# Patient Record
Sex: FEMALE | Race: WHITE | NOT HISPANIC OR LATINO | Employment: UNEMPLOYED | URBAN - METROPOLITAN AREA
[De-identification: names, ages, dates, MRNs, and addresses within clinical notes are randomized per-mention and may not be internally consistent; named-entity substitution may affect disease eponyms.]

---

## 2017-10-05 ENCOUNTER — OFFICE VISIT (OUTPATIENT)
Dept: RETAIL CLINIC | Facility: CLINIC | Age: 32
End: 2017-10-05

## 2017-10-05 DIAGNOSIS — Z02.83 ENCOUNTER FOR DRUG SCREENING: Primary | ICD-10-CM

## 2017-10-05 NOTE — PROGRESS NOTES
Taylor GREENBERG is a 31 y.o. female.     Patient presents to clinic for an E-Screen. See scanned documents.     Diagnosis for this visit:  Encounter for drug screening [Z02.83]    Notes: See scanned [facility].    Follow-up with employer results.

## 2021-05-28 ENCOUNTER — PRIOR AUTHORIZATION (OUTPATIENT)
Dept: GASTROENTEROLOGY | Facility: CLINIC | Age: 36
End: 2021-05-28

## 2021-05-30 ENCOUNTER — APPOINTMENT (OUTPATIENT)
Dept: PREADMISSION TESTING | Facility: HOSPITAL | Age: 36
End: 2021-05-30

## 2021-05-30 LAB — SARS-COV-2 RNA NOSE QL NAA+PROBE: NOT DETECTED

## 2021-05-30 PROCEDURE — U0004 COV-19 TEST NON-CDC HGH THRU: HCPCS

## 2021-05-30 PROCEDURE — C9803 HOPD COVID-19 SPEC COLLECT: HCPCS

## 2021-06-02 ENCOUNTER — OUTSIDE FACILITY SERVICE (OUTPATIENT)
Dept: GASTROENTEROLOGY | Facility: CLINIC | Age: 36
End: 2021-06-02

## 2021-06-02 PROCEDURE — 88305 TISSUE EXAM BY PATHOLOGIST: CPT | Performed by: INTERNAL MEDICINE

## 2021-06-02 PROCEDURE — 43239 EGD BIOPSY SINGLE/MULTIPLE: CPT | Performed by: INTERNAL MEDICINE

## 2021-06-03 ENCOUNTER — LAB REQUISITION (OUTPATIENT)
Dept: LAB | Facility: HOSPITAL | Age: 36
End: 2021-06-03

## 2021-06-03 DIAGNOSIS — K21.9 GASTRO-ESOPHAGEAL REFLUX DISEASE WITHOUT ESOPHAGITIS: ICD-10-CM

## 2021-06-03 DIAGNOSIS — R11.0 NAUSEA: ICD-10-CM

## 2021-06-04 LAB
CYTO UR: NORMAL
LAB AP CASE REPORT: NORMAL
LAB AP CLINICAL INFORMATION: NORMAL
PATH REPORT.FINAL DX SPEC: NORMAL
PATH REPORT.GROSS SPEC: NORMAL

## 2022-07-26 ENCOUNTER — OFFICE VISIT (OUTPATIENT)
Dept: FAMILY MEDICINE CLINIC | Facility: CLINIC | Age: 37
End: 2022-07-26

## 2022-07-26 VITALS
SYSTOLIC BLOOD PRESSURE: 108 MMHG | DIASTOLIC BLOOD PRESSURE: 64 MMHG | BODY MASS INDEX: 32.07 KG/M2 | TEMPERATURE: 98.1 F | HEART RATE: 84 BPM | OXYGEN SATURATION: 97 % | WEIGHT: 224 LBS | HEIGHT: 70 IN

## 2022-07-26 DIAGNOSIS — Z13.220 LIPID SCREENING: ICD-10-CM

## 2022-07-26 DIAGNOSIS — R53.83 FATIGUE, UNSPECIFIED TYPE: ICD-10-CM

## 2022-07-26 DIAGNOSIS — E11.65 TYPE 2 DIABETES MELLITUS WITH HYPERGLYCEMIA, WITHOUT LONG-TERM CURRENT USE OF INSULIN: Primary | ICD-10-CM

## 2022-07-26 PROCEDURE — 99204 OFFICE O/P NEW MOD 45 MIN: CPT | Performed by: PHYSICIAN ASSISTANT

## 2022-07-26 PROCEDURE — 36415 COLL VENOUS BLD VENIPUNCTURE: CPT | Performed by: PHYSICIAN ASSISTANT

## 2022-07-26 RX ORDER — METFORMIN HYDROCHLORIDE 500 MG/1
500 TABLET, EXTENDED RELEASE ORAL
COMMUNITY
Start: 2022-07-15 | End: 2022-07-27 | Stop reason: SDUPTHER

## 2022-07-26 NOTE — PROGRESS NOTES
"Chief Complaint  Diabetes    Subjective          Dottie GERENBERG presents to [facility]  Patient in today to establish care and to get labs drawn.  She is a type 2  diabetic and has been off her medication for past 3 months up until the last 2 weeks, when she was started back on metformin 500 once a day through the ER.  She had the ER visit for episode of dehydration and near syncopal episode.  She has been seeing hematology for persistent leukocytosis and is still in further evaluation with them . States her sugars had been running in the high 200s without medication.  Denies any chest pain or shortness of breath.    Diabetes  She presents for her follow-up diabetic visit. She has type 2 diabetes mellitus. Pertinent negatives for hypoglycemia include no dizziness. Pertinent negatives for diabetes include no blurred vision, no chest pain and no fatigue.       Objective   Vital Signs:   /64 (BP Location: Left arm, Patient Position: Sitting, Cuff Size: Large Adult)   Pulse 84   Temp 98.1 °F (36.7 °C)   Ht 176.5 cm (69.5\")   Wt 102 kg (224 lb)   SpO2 97%   BMI 32.60 kg/m²     Body mass index is 32.6 kg/m².    Review of Systems   Constitutional: Negative for fatigue.   HENT: Negative for congestion and sore throat.    Eyes: Negative for blurred vision.   Respiratory: Negative for cough and shortness of breath.    Cardiovascular: Negative for chest pain.   Gastrointestinal: Negative for abdominal pain, diarrhea, nausea and vomiting.   Genitourinary: Negative for dysuria and frequency.   Neurological: Negative for dizziness and headache.       Past History:  Medical History: has a past medical history of Anorexia, Anxiety, Bipolar disorder (HCC), Bulimia, Chronic insomnia, Depression, Gestational diabetes, Panic disorder, and Pregnancy.   Surgical History: has a past surgical history that includes Hand surgery (Right, 2000); Nose surgery (1994); Tonsillectomy; Cholecystectomy; and " Other surgical history.   Family History: family history includes Breast cancer (age of onset: 50) in her maternal aunt; Depression in her mother; Diabetes in her maternal grandmother and mother; Hypertension in her maternal grandfather and mother; Hypothyroidism in her mother; Kidney failure in her mother; Ovarian cancer in her maternal aunt.   Social History: reports that she quit smoking about 7 weeks ago. Her smoking use included cigarettes. She started smoking about 19 years ago. She smoked 0.50 packs per day. She has never used smokeless tobacco. She reports that she does not drink alcohol and does not use drugs.      Current Outpatient Medications:   •  metFORMIN ER (GLUCOPHAGE-XR) 500 MG 24 hr tablet, Take 500 mg by mouth Daily With Breakfast., Disp: , Rfl:   Allergies: Aspirin    Physical Exam  Constitutional:       Appearance: Normal appearance.   HENT:      Right Ear: Tympanic membrane normal.      Left Ear: Tympanic membrane normal.      Mouth/Throat:      Pharynx: Oropharynx is clear.   Eyes:      Conjunctiva/sclera: Conjunctivae normal.      Pupils: Pupils are equal, round, and reactive to light.   Cardiovascular:      Rate and Rhythm: Normal rate and regular rhythm.      Heart sounds: Normal heart sounds.   Pulmonary:      Effort: Pulmonary effort is normal.      Breath sounds: Normal breath sounds.   Abdominal:      Palpations: Abdomen is soft.      Tenderness: There is no abdominal tenderness.   Neurological:      Mental Status: She is oriented to person, place, and time.   Psychiatric:         Mood and Affect: Mood normal.         Behavior: Behavior normal.             Assessment and Plan   Diagnoses and all orders for this visit:    1. Type 2 diabetes mellitus with hyperglycemia, without long-term current use of insulin (HCC) (Primary)  -     Comprehensive Metabolic Panel; Future  -     Hemoglobin A1c; Future  We will check hemoglobin A1c today.  Encouraged healthy diet and exercise.  Discussed  suspect we will be increasing dosage of her metformin and then recheck in 1 month with sugar readings.  Return to clinic prior as needed.  2. Fatigue, unspecified type  -     TSH; Future  Check TSH with labs.  3. Lipid screening  -     Lipid Panel; Future  We will check fasting labs today.          Follow Up   No follow-ups on file.  Patient was given instructions and counseling regarding her condition or for health maintenance advice. Please see specific information pulled into the AVS if appropriate.     Carie Headley PA-C

## 2022-07-27 LAB
ALBUMIN SERPL-MCNC: 4.4 G/DL (ref 3.8–4.8)
ALBUMIN/GLOB SERPL: 1.5 {RATIO} (ref 1.2–2.2)
ALP SERPL-CCNC: 146 IU/L (ref 44–121)
ALT SERPL-CCNC: 12 IU/L (ref 0–32)
AST SERPL-CCNC: 12 IU/L (ref 0–40)
BILIRUB SERPL-MCNC: 0.3 MG/DL (ref 0–1.2)
BUN SERPL-MCNC: 12 MG/DL (ref 6–20)
BUN/CREAT SERPL: 19 (ref 9–23)
CALCIUM SERPL-MCNC: 9.5 MG/DL (ref 8.7–10.2)
CHLORIDE SERPL-SCNC: 95 MMOL/L (ref 96–106)
CHOLEST SERPL-MCNC: 203 MG/DL (ref 100–199)
CO2 SERPL-SCNC: 20 MMOL/L (ref 20–29)
CREAT SERPL-MCNC: 0.63 MG/DL (ref 0.57–1)
EGFRCR SERPLBLD CKD-EPI 2021: 117 ML/MIN/1.73
GLOBULIN SER CALC-MCNC: 2.9 G/DL (ref 1.5–4.5)
GLUCOSE SERPL-MCNC: 395 MG/DL (ref 65–99)
HBA1C MFR BLD: 10.8 % (ref 4.8–5.6)
HDLC SERPL-MCNC: 30 MG/DL
LDLC SERPL CALC-MCNC: 109 MG/DL (ref 0–99)
POTASSIUM SERPL-SCNC: 4.3 MMOL/L (ref 3.5–5.2)
PROT SERPL-MCNC: 7.3 G/DL (ref 6–8.5)
SODIUM SERPL-SCNC: 132 MMOL/L (ref 134–144)
TRIGL SERPL-MCNC: 372 MG/DL (ref 0–149)
TSH SERPL DL<=0.005 MIU/L-ACNC: 1.77 UIU/ML (ref 0.45–4.5)
VLDLC SERPL CALC-MCNC: 64 MG/DL (ref 5–40)

## 2022-07-27 RX ORDER — METFORMIN HYDROCHLORIDE 500 MG/1
TABLET, EXTENDED RELEASE ORAL
Qty: 60 TABLET | Refills: 0 | Status: SHIPPED | OUTPATIENT
Start: 2022-07-27 | End: 2022-08-26

## 2022-07-28 ENCOUNTER — TELEPHONE (OUTPATIENT)
Dept: FAMILY MEDICINE CLINIC | Facility: CLINIC | Age: 37
End: 2022-07-28

## 2022-07-29 NOTE — TELEPHONE ENCOUNTER
Caller: Dottie GREENBERG    Relationship: Self    Best call back number: [phone]    Requested Prescriptions:   Requested Prescriptions      No prescriptions requested or ordered in this encounter      ONE TOUCH TEST STRIPS     Pharm[facility] where [id number] [address] AT [facility] & [address] [facility] [phone] [facility] [phone]      Additional details provided by patient: ONE TOUCH TEST STRIPS     Does the patient have less than a 3 day supply:  [x] Yes  [] No    Maude Luis Rep   07/28/22 12:47 EDT          
LVM for pt to call back   
PT CALLED BACK, REPORTS SHE USES ONE TOUCH ULTRA 2.   
Rosi please see below  
Sending to Rosi  
Sent in test strips.   
Tried to call patient - no answer. Please check to see which one touch glucometer she has so can send correct strips;  thanks  
[id number]

## 2022-08-26 RX ORDER — METFORMIN HYDROCHLORIDE 500 MG/1
TABLET, EXTENDED RELEASE ORAL
Qty: 60 TABLET | Refills: 0 | Status: SHIPPED | OUTPATIENT
Start: 2022-08-26 | End: 2022-09-13 | Stop reason: SDUPTHER

## 2022-09-13 ENCOUNTER — OFFICE VISIT (OUTPATIENT)
Dept: FAMILY MEDICINE CLINIC | Facility: CLINIC | Age: 37
End: 2022-09-13

## 2022-09-13 VITALS
WEIGHT: 230 LBS | HEART RATE: 106 BPM | OXYGEN SATURATION: 97 % | SYSTOLIC BLOOD PRESSURE: 124 MMHG | BODY MASS INDEX: 32.93 KG/M2 | HEIGHT: 70 IN | DIASTOLIC BLOOD PRESSURE: 80 MMHG

## 2022-09-13 DIAGNOSIS — E11.65 TYPE 2 DIABETES MELLITUS WITH HYPERGLYCEMIA, WITHOUT LONG-TERM CURRENT USE OF INSULIN: Primary | ICD-10-CM

## 2022-09-13 PROCEDURE — 99213 OFFICE O/P EST LOW 20 MIN: CPT | Performed by: PHYSICIAN ASSISTANT

## 2022-09-13 RX ORDER — METFORMIN HYDROCHLORIDE 500 MG/1
TABLET, EXTENDED RELEASE ORAL
Qty: 90 TABLET | Refills: 1 | Status: SHIPPED | OUTPATIENT
Start: 2022-09-13

## 2022-09-13 NOTE — PROGRESS NOTES
"Chief Complaint  Follow-up (Diabetic follow up)    Subjective          Dottie GREENBERG presents to [facility]  Patient in today to recheck on her diabetes.  She states she has seen improvement with her numbers.  She states her average is now down to around 203.  Her last hemoglobin A1c was 10.8 and day of testing was at 395.  She has cut out soda as well as work on improving her diet and getting exercise with walking.  Denies any chest pain or shortness of breath.  He is currently taking the metformin 1 pill twice a day.  Denies any side effects of medication.    Diabetes  She presents for her follow-up diabetic visit. She has type 2 diabetes mellitus. Pertinent negatives for hypoglycemia include no dizziness. Pertinent negatives for diabetes include no blurred vision, no chest pain and no fatigue.       Objective   Vital Signs:   /80 (BP Location: Left arm, Patient Position: Sitting, Cuff Size: Large Adult)   Pulse 106   Ht 176.5 cm (69.5\")   Wt 104 kg (230 lb)   SpO2 97%   BMI 33.48 kg/m²     Body mass index is 33.48 kg/m².    Review of Systems   Constitutional: Negative for fatigue.   HENT: Negative for congestion and sore throat.    Eyes: Negative for blurred vision.   Respiratory: Negative for cough and shortness of breath.    Cardiovascular: Negative for chest pain.   Gastrointestinal: Negative for abdominal pain, diarrhea, nausea and vomiting.   Neurological: Negative for dizziness and headache.       Past History:  Medical History: has a past medical history of Anorexia, Anxiety, Bipolar disorder (HCC), Bulimia, Chronic insomnia, Depression, Gestational diabetes, Panic disorder, and Pregnancy.   Surgical History: has a past surgical history that includes Hand surgery (Right, 2000); Nose surgery (1994); Tonsillectomy; Cholecystectomy; and Other surgical history.   Family History: family history includes Breast cancer (age of onset: 50) in her maternal aunt; Depression in " her mother; Diabetes in her maternal grandmother and mother; Hypertension in her maternal grandfather and mother; Hypothyroidism in her mother; Kidney failure in her mother; Ovarian cancer in her maternal aunt.   Social History: reports that she quit smoking about 3 months ago. Her smoking use included cigarettes. She started smoking about 19 years ago. She smoked 0.50 packs per day. She has never used smokeless tobacco. She reports that she does not drink alcohol and does not use drugs.      Current Outpatient Medications:   •  glucose blood test strip, Check FSBS BID, Disp: 100 each, Rfl: 3  •  metFORMIN ER (GLUCOPHAGE-XR) 500 MG 24 hr tablet, Take 1 tablet in AM and 2 tablets in PM with meals, Disp: 90 tablet, Rfl: 1  Allergies: Aspirin    Physical Exam  Constitutional:       Appearance: Normal appearance.   Cardiovascular:      Rate and Rhythm: Normal rate and regular rhythm.      Heart sounds: Normal heart sounds.   Pulmonary:      Effort: Pulmonary effort is normal.      Breath sounds: Normal breath sounds.   Neurological:      Mental Status: She is alert and oriented to person, place, and time.   Psychiatric:         Behavior: Behavior normal.             Assessment and Plan   Diagnoses and all orders for this visit:    1. Type 2 diabetes mellitus with hyperglycemia, without long-term current use of insulin (HCC) (Primary)  Her sugars have shown significant improvement. Will increase the metformin to 1 pill in AM and 2 pills in PM and recheck again with readings in 4-6 weeks, prior as needed. Continue working on healthy diet and exercise.   Other orders  -     metFORMIN ER (GLUCOPHAGE-XR) 500 MG 24 hr tablet; Take 1 tablet in AM and 2 tablets in PM with meals  Dispense: 90 tablet; Refill: 1            Follow Up   No follow-ups on file.  Patient was given instructions and counseling regarding her condition or for health maintenance advice. Please see specific information pulled into the AVS if appropriate.      Carie Headley PA-C

## 2022-09-23 ENCOUNTER — TELEPHONE (OUTPATIENT)
Dept: FAMILY MEDICINE CLINIC | Facility: CLINIC | Age: 37
End: 2022-09-23

## 2022-09-23 DIAGNOSIS — E11.65 TYPE 2 DIABETES MELLITUS WITH HYPERGLYCEMIA, WITHOUT LONG-TERM CURRENT USE OF INSULIN: Primary | ICD-10-CM

## 2022-09-23 NOTE — TELEPHONE ENCOUNTER
Can you put n referral for pt to see endo for paperwork and sugars concerns about either getting high or to low. Pt said her sugar was 454 one day and had to leave work. Pt was fine with seeing endo and can only do days Monday, Tuesday and Wednesday.

## 2023-05-19 NOTE — PROGRESS NOTES
Chief complaint/Reason for consult: Type 2 diabetes mellitus    Consult requested by STACI Santamaria    HPI: Ms. Gómez is a 37-year-old female with diabetes who comes for consultation of hyperglycemia.    # [id number], Uncontrolled due to hyperglycemia  with complications  # Diabetic polyneuropathy   - Diagnosed: 2019, was diagnosed with GDM during pregnancy with her last two children 2009 and 2012/2013 and took metformin for these  - Reports a strong family history of T2DM   - Patient reports working on lifestyle modifications with dietary changes and increase physical activity for weight loss; previously she weighed 500 pounds and is now down to 223 pounds  - Current regimen includes: Ozempic 0.25 mg weekly, previously on a higher dose but stopped it during workup for possible cancer which ended up being negative   - Patient did not tolerate metformin due to GI issues  - Patient was previously on Jardiance but stopped it for unknown reasons, thinks it may have been due to yeast infection/UTI although has not had one for several months  - Compliance with medications is good  - HbA1c: 9.8% today  - Patient is not checking fingerstick blood glucose  - BG review unavailable  - Hypoglycemia is not occurring  - Hyperglycemia occurs with poor food choices and missed medications   - Patient reports neuropathy in her hands and feet   - Patient denies gastroparesis   - Patient without known ASCVD   - not taking ACEi/ARB; blood pressure today 130/80    DM Health Maintenance:  Ophtho: last done 9/2022 (not dilated) and she reports no known retinopathy but goes back this September for a dilated exam   Monofilament / Foot exam: Due today  Lipids/Statin: not taking a statin with last FLP showing  done 7/26/2022  ERLIN: Due  TSH: Due  Aspirin: not taking     # Mixed hyperlipidemia  - Fasting lipids done 7/26/2022 showed cholesterol 203, triglycerides 372, HDL 30 and   - No history of pancreatitis  - No known  "cardiovascular disease  - Not currently on medical therapy  - No plans for future pregnancy     Past medical history, past surgical history, family history and social history reviewed within this encounter.     Review of Systems   Constitutional: Negative for activity change, fatigue and unexpected weight change.   HENT: Negative for trouble swallowing.    Eyes: Positive for visual disturbance.   Respiratory: Negative for shortness of breath.    Cardiovascular: Negative for chest pain.   Gastrointestinal: Negative for abdominal pain, diarrhea, nausea and vomiting.   Endocrine: Negative for cold intolerance and heat intolerance.   Genitourinary: Negative for dysuria and vaginal discharge.   Musculoskeletal: Negative for arthralgias.   Skin: Negative for rash.   Neurological: Positive for numbness.   Psychiatric/Behavioral: Negative for agitation and confusion.        /80   Pulse 106   Ht 175.3 cm (69\")   Wt 101 kg (223 lb)   SpO2 98%   BMI 32.93 kg/m²      Physical Exam  Vitals reviewed.   Constitutional:       General: She is not in acute distress.     Appearance: She is obese.   HENT:      Head: Normocephalic.   Eyes:      Conjunctiva/sclera: Conjunctivae normal.   Neck:      Comments: No palpable goiter or thyroid nodules  Cardiovascular:      Rate and Rhythm: Normal rate.      Pulses: Normal pulses.           Dorsalis pedis pulses are 2+ on the right side and 2+ on the left side.   Pulmonary:      Effort: Pulmonary effort is normal.   Abdominal:      Tenderness: There is no guarding.   Musculoskeletal:         General: Normal range of motion.      Cervical back: Neck supple.      Right foot: No deformity or bunion.      Left foot: No deformity or bunion.        Feet:    Feet:      Right foot:      Protective Sensation: 8 sites tested. 8 sites sensed.      Skin integrity: Skin integrity normal.      Toenail Condition: Right toenails are abnormally thick.      Left foot:      Protective Sensation: 8 " sites tested. 8 sites sensed.      Skin integrity: Skin integrity normal.      Toenail Condition: Left toenails are abnormally thick.   Skin:     General: Skin is warm and dry.   Neurological:      Mental Status: She is alert and oriented to person, place, and time.   Psychiatric:         Mood and Affect: Mood normal.         Behavior: Behavior normal.         Thought Content: Thought content normal.        Labs and images reviewed as noted in the HPI    Assessment and plan:    Diagnoses and all orders for this visit:    1. Type 2 diabetes mellitus with hyperglycemia, without long-term current use of insulin (Primary)  Assessment & Plan:  -Diabetes is currently uncontrolled due to hyperglycemia  -We will check a C-peptide to screen for type 2 diabetes mellitus, if it is low will consider getting antibody test, if it is detectable or elevated she likely has type 2 diabetes mellitus  -Complications include diabetic polyneuropathy  -Patient remains high risk for complications due to persistent hyperglycemia; counseled that long term hyperglycemia can cause worsening neuropathy, kidney damage, eye damage, and cardiovascular disease  -Recommend increasing Ozempic to 0.5 mg weekly  -Recommend starting Tresiba U100 20 units once daily; increase dose by 3 units every 2 to 3 days for fasting blood glucose greater than 150 mg/dL  -Recommend starting Jardiance 10 mg daily  -Counseled on risk of insulin therapy with SGLT2 inhibitor and GLP-1 agonist causing hypoglycemia; counseled on symptoms of hypoglycemia, how to check for hypoglycemia and how to treat hypoglycemia  -Counseled on the risk SGLT2 inhibitor including the risk of skin and soft tissue infection, urinary tract infection and yeast infection and recommend she seek medical attention for any of the above concerns  -Recommend patient check fingerstick blood glucose at least once daily fasting and as needed for signs or symptoms of hypoglycemia/hyperglycemia  -Okay to  continue holding ACEi/ARB as long as blood pressure is at goal and patient has negative urine microalbumin; blood pressure today 130/80    DM Health Maintenance:  Ophtho: last done 9/2022 (not dilated) and she reports no known retinopathy but goes back this September for a dilated exam   Monofilament / Foot exam: Completed today as noted above  Lipids/Statin: not taking a statin with last FLP showing  done 7/26/2022  ERLIN: Ordered  TSH: Ordered  Aspirin: not taking     Orders:  -     POC Glycosylated Hemoglobin (Hb A1C)  -     C-Peptide; Future  -     Basic Metabolic Panel; Future  -     TSH; Future  -     Semaglutide,0.25 or 0.5MG/DOS, (OZEMPIC) 2 MG/3ML solution pen-injector; Inject 0.5 mg weekly  Dispense: 3 mL; Refill: 0  -     Microalbumin / Creatinine Urine Ratio - Urine, Clean Catch; Future  -     insulin degludec (Tresiba FlexTouch) 100 UNIT/ML solution pen-injector injection; Inject 20u once daily; titrate for max daily dose of 50u  Dispense: 15 mL; Refill: 11  -     Insulin Pen Needle 32G X 4 MM misc; Use as directed daily  Dispense: 100 each; Refill: 3  -     empagliflozin (Jardiance) 10 MG tablet tablet; Take 1 tablet daily by mouth  Dispense: 30 tablet; Refill: 3    2. Diabetic polyneuropathy associated with type 2 diabetes mellitus  Assessment & Plan:  -Recommend improve glycemic control to prevent progression      3. Mixed hyperlipidemia  Assessment & Plan:  -Lipids are uncontrolled  -Recommend improved glycemic control to help with elevated triglycerides  -Recommend reduction in saturated fats to help with elevated LDL  -Patient likely will need statin therapy; will repeat fasting lipid panel once blood glucoses are slightly better controlled and if her LDL remains above 70 will discuss the addition of statin medication       Return in about 1 month (around 6/24/2023) for T2DM .     Electronically signed by: Kyle S Rosenstein, MD   Addendum  Labs 5/24/2023  Serum glucose 247 mg/dL, C-peptide  2.7  TSH 1.220    Patient likely has type 2 diabetes mellitus given C-peptide of 2.7

## 2023-05-24 ENCOUNTER — LAB (OUTPATIENT)
Dept: FAMILY MEDICINE CLINIC | Facility: CLINIC | Age: 38
End: 2023-05-24
Payer: COMMERCIAL

## 2023-05-24 ENCOUNTER — OFFICE VISIT (OUTPATIENT)
Dept: ENDOCRINOLOGY | Facility: CLINIC | Age: 38
End: 2023-05-24
Payer: COMMERCIAL

## 2023-05-24 VITALS
WEIGHT: 223 LBS | DIASTOLIC BLOOD PRESSURE: 80 MMHG | HEART RATE: 106 BPM | BODY MASS INDEX: 33.03 KG/M2 | SYSTOLIC BLOOD PRESSURE: 130 MMHG | HEIGHT: 69 IN | OXYGEN SATURATION: 98 %

## 2023-05-24 DIAGNOSIS — E11.65 TYPE 2 DIABETES MELLITUS WITH HYPERGLYCEMIA, WITHOUT LONG-TERM CURRENT USE OF INSULIN: ICD-10-CM

## 2023-05-24 DIAGNOSIS — E11.42 DIABETIC POLYNEUROPATHY ASSOCIATED WITH TYPE 2 DIABETES MELLITUS: ICD-10-CM

## 2023-05-24 DIAGNOSIS — E11.65 TYPE 2 DIABETES MELLITUS WITH HYPERGLYCEMIA, WITHOUT LONG-TERM CURRENT USE OF INSULIN: Primary | ICD-10-CM

## 2023-05-24 DIAGNOSIS — E78.2 MIXED HYPERLIPIDEMIA: ICD-10-CM

## 2023-05-24 LAB
EXPIRATION DATE: NORMAL
HBA1C MFR BLD: 9.8 %
Lab: NORMAL

## 2023-05-24 PROCEDURE — 36415 COLL VENOUS BLD VENIPUNCTURE: CPT | Performed by: HOSPITALIST

## 2023-05-24 RX ORDER — SEMAGLUTIDE 1.34 MG/ML
INJECTION, SOLUTION SUBCUTANEOUS
COMMUNITY
Start: 2023-03-07 | End: 2023-05-24 | Stop reason: DRUGHIGH

## 2023-05-24 RX ORDER — INSULIN DEGLUDEC INJECTION 100 U/ML
INJECTION, SOLUTION SUBCUTANEOUS
Qty: 15 ML | Refills: 11 | Status: SHIPPED | OUTPATIENT
Start: 2023-05-24

## 2023-05-24 NOTE — ASSESSMENT & PLAN NOTE
-Diabetes is currently uncontrolled due to hyperglycemia  -We will check a C-peptide to screen for type 2 diabetes mellitus, if it is low will consider getting antibody test, if it is detectable or elevated she likely has type 2 diabetes mellitus  -Complications include diabetic polyneuropathy  -Patient remains high risk for complications due to persistent hyperglycemia; counseled that long term hyperglycemia can cause worsening neuropathy, kidney damage, eye damage, and cardiovascular disease  -Recommend increasing Ozempic to 0.5 mg weekly  -Recommend starting Tresiba U100 20 units once daily; increase dose by 3 units every 2 to 3 days for fasting blood glucose greater than 150 mg/dL  -Recommend starting Jardiance 10 mg daily  -Counseled on risk of insulin therapy with SGLT2 inhibitor and GLP-1 agonist causing hypoglycemia; counseled on symptoms of hypoglycemia, how to check for hypoglycemia and how to treat hypoglycemia  -Counseled on the risk SGLT2 inhibitor including the risk of skin and soft tissue infection, urinary tract infection and yeast infection and recommend she seek medical attention for any of the above concerns  -Recommend patient check fingerstick blood glucose at least once daily fasting and as needed for signs or symptoms of hypoglycemia/hyperglycemia  -Okay to continue holding ACEi/ARB as long as blood pressure is at goal and patient has negative urine microalbumin; blood pressure today 130/80    DM Health Maintenance:  Ophtho: last done 9/2022 (not dilated) and she reports no known retinopathy but goes back this September for a dilated exam   Monofilament / Foot exam: Completed today as noted above  Lipids/Statin: not taking a statin with last FLP showing  done 7/26/2022  ERLIN: Ordered  TSH: Ordered  Aspirin: not taking

## 2023-05-24 NOTE — ASSESSMENT & PLAN NOTE
-Lipids are uncontrolled  -Recommend improved glycemic control to help with elevated triglycerides  -Recommend reduction in saturated fats to help with elevated LDL  -Patient likely will need statin therapy; will repeat fasting lipid panel once blood glucoses are slightly better controlled and if her LDL remains above 70 will discuss the addition of statin medication

## 2023-05-25 LAB
BUN SERPL-MCNC: 7 MG/DL (ref 6–20)
BUN/CREAT SERPL: 11 (ref 9–23)
C PEPTIDE SERPL-MCNC: 2.7 NG/ML (ref 1.1–4.4)
CALCIUM SERPL-MCNC: 9.7 MG/DL (ref 8.7–10.2)
CHLORIDE SERPL-SCNC: 98 MMOL/L (ref 96–106)
CO2 SERPL-SCNC: 21 MMOL/L (ref 20–29)
CREAT SERPL-MCNC: 0.63 MG/DL (ref 0.57–1)
EGFRCR SERPLBLD CKD-EPI 2021: 116 ML/MIN/1.73
GLUCOSE SERPL-MCNC: 247 MG/DL (ref 70–99)
POTASSIUM SERPL-SCNC: 4.1 MMOL/L (ref 3.5–5.2)
SODIUM SERPL-SCNC: 136 MMOL/L (ref 134–144)
TSH SERPL DL<=0.005 MIU/L-ACNC: 1.22 UIU/ML (ref 0.45–4.5)

## 2023-10-30 ENCOUNTER — TELEPHONE (OUTPATIENT)
Dept: ENDOCRINOLOGY | Facility: CLINIC | Age: 38
End: 2023-10-30
Payer: COMMERCIAL

## 2023-10-30 NOTE — TELEPHONE ENCOUNTER
Patient unable to get       insulin degludec (Tresiba FlexTouch) 100 UNIT/ML solution pen-injector injection       Due to Insurance is no longer covering.    Patient is out of insulin and needs help finding something to replace this.

## 2023-10-30 NOTE — TELEPHONE ENCOUNTER
Called the pt and told her she needs to call her ins to get the names(s) of meds that are covered. Once she has that info to call back and let us know.    She verbalized understanding.

## 2023-11-01 NOTE — TELEPHONE ENCOUNTER
PT CALLED AND SAID SHE TALKED TO INSURANCE AND JUST NEEDS THE TRESIBA TO BE SENT TO THE [facility] IN [place].